# Patient Record
Sex: FEMALE | Race: WHITE | NOT HISPANIC OR LATINO | Employment: OTHER | URBAN - METROPOLITAN AREA
[De-identification: names, ages, dates, MRNs, and addresses within clinical notes are randomized per-mention and may not be internally consistent; named-entity substitution may affect disease eponyms.]

---

## 2017-08-16 ENCOUNTER — ESTABLISHED PATIENT (OUTPATIENT)
Dept: URBAN - METROPOLITAN AREA CLINIC 36 | Facility: CLINIC | Age: 72
End: 2017-08-16

## 2017-08-16 DIAGNOSIS — H04.123: ICD-10-CM

## 2017-08-16 DIAGNOSIS — H35.373: ICD-10-CM

## 2017-08-16 DIAGNOSIS — H35.3130: ICD-10-CM

## 2017-08-16 DIAGNOSIS — H26.493: ICD-10-CM

## 2017-08-16 DIAGNOSIS — Z96.1: ICD-10-CM

## 2017-08-16 PROCEDURE — G8427 DOCREV CUR MEDS BY ELIG CLIN: HCPCS

## 2017-08-16 PROCEDURE — 92015 DETERMINE REFRACTIVE STATE: CPT

## 2017-08-16 PROCEDURE — 1036F TOBACCO NON-USER: CPT

## 2017-08-16 PROCEDURE — 92014 COMPRE OPH EXAM EST PT 1/>: CPT

## 2017-08-16 PROCEDURE — G8785 BP SCRN NO PERF AT INTERVAL: HCPCS

## 2017-08-16 ASSESSMENT — VISUAL ACUITY
OS_SC: 20/70
OS_PH: 20/25-1
OS_SC: J1+
OD_SC: 20/25-1
OD_SC: J5

## 2017-08-16 ASSESSMENT — TONOMETRY
OS_IOP_MMHG: 22
OD_IOP_MMHG: 19

## 2018-04-09 NOTE — PATIENT DISCUSSION
(H25.13) Age-related nuclear cataract, bilateral - Assesment : Examination revealed cataract. CL wearer. Pt deferred dilation today. - Plan : Monitor for changes. Advised patient to call our office with decreased vision or increased symptoms. Updated GLRx and CLRx given today. RTC in 1 year for CL Exam, sooner if problems or changes occur.

## 2018-08-21 ENCOUNTER — ESTABLISHED COMPREHENSIVE EXAM (OUTPATIENT)
Dept: URBAN - METROPOLITAN AREA CLINIC 36 | Facility: CLINIC | Age: 73
End: 2018-08-21

## 2018-08-21 DIAGNOSIS — H35.3130: ICD-10-CM

## 2018-08-21 DIAGNOSIS — H35.373: ICD-10-CM

## 2018-08-21 PROCEDURE — 4177F TALK PT/CRGVR RE AREDS PREV: CPT

## 2018-08-21 PROCEDURE — G9903 PT SCRN TBCO ID AS NON USER: HCPCS

## 2018-08-21 PROCEDURE — 1036F TOBACCO NON-USER: CPT

## 2018-08-21 PROCEDURE — 92014 COMPRE OPH EXAM EST PT 1/>: CPT

## 2018-08-21 PROCEDURE — 2019F DILATED MACUL EXAM DONE: CPT

## 2018-08-21 PROCEDURE — G8427 DOCREV CUR MEDS BY ELIG CLIN: HCPCS

## 2018-08-21 ASSESSMENT — VISUAL ACUITY
OS_SC: J1+
OS_SC: 20/70
OU_SC: 20/25
OU_SC: J1+
OD_SC: J5
OD_SC: 20/20-2
OS_PH: 20/40+2

## 2018-08-21 ASSESSMENT — TONOMETRY
OS_IOP_MMHG: 16
OD_IOP_MMHG: 16

## 2019-07-01 NOTE — PATIENT DISCUSSION
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 No changes reported. No holes or tears noted today. - Plan : Monitor for changes. Advised patient to call our office with decreased vision or an increase in flashes and/or floaters.

## 2019-07-01 NOTE — PATIENT DISCUSSION
(H25.13) Age-related nuclear cataract, bilateral - Assesment : Examination revealed cataract. Pt is bothered and symptomatic. Due to vision changes pt is nervous driving her grandkids around. CL wearer. Pt has some claustrophobia. - Plan : Recommended cataract extraction to improve visual function. Discussed procedure, lens options, risks, and benefits. All questions answered. Pt wishes to proceed. Schedule A-scan and consult.

## 2019-08-15 NOTE — PATIENT DISCUSSION
"(H11.432) Conjunctival hyperemia, left eye - Assesment : Conjunctival hyperemia, left eye. - Plan : Start Gatifloxacin gtts OS tid for 1 week. E-Rx sent to pharmacy. Discontinue CL wear until symptoms improve and stops Gatifloxacin. Recommended regular use of ATs, such as Refresh Repair or Refresh Optive.  Advised to avoid ""get the red out"" drops

## 2019-08-22 ENCOUNTER — ESTABLISHED COMPREHENSIVE EXAM (OUTPATIENT)
Dept: URBAN - METROPOLITAN AREA CLINIC 36 | Facility: CLINIC | Age: 74
End: 2019-08-22

## 2019-08-22 DIAGNOSIS — H35.3131: ICD-10-CM

## 2019-08-22 DIAGNOSIS — H26.491: ICD-10-CM

## 2019-08-22 DIAGNOSIS — H26.492: ICD-10-CM

## 2019-08-22 DIAGNOSIS — H35.373: ICD-10-CM

## 2019-08-22 PROCEDURE — 92014 COMPRE OPH EXAM EST PT 1/>: CPT

## 2019-08-22 PROCEDURE — 92134 CPTRZ OPH DX IMG PST SGM RTA: CPT

## 2019-08-22 ASSESSMENT — VISUAL ACUITY
OU_SC: 20/30+1
OU_SC: J1+
OS_SC: J1
OD_SC: J5
OS_PH: 20/40+2
OS_SC: 20/200
OD_SC: 20/30-2

## 2019-08-22 ASSESSMENT — TONOMETRY
OD_IOP_MMHG: 17
OS_IOP_MMHG: 22

## 2020-09-01 ENCOUNTER — ESTABLISHED COMPREHENSIVE EXAM (OUTPATIENT)
Dept: URBAN - METROPOLITAN AREA CLINIC 36 | Facility: CLINIC | Age: 75
End: 2020-09-01

## 2020-09-01 DIAGNOSIS — H35.341: ICD-10-CM

## 2020-09-01 DIAGNOSIS — H35.373: ICD-10-CM

## 2020-09-01 DIAGNOSIS — H35.3131: ICD-10-CM

## 2020-09-01 DIAGNOSIS — H52.7: ICD-10-CM

## 2020-09-01 DIAGNOSIS — H04.123: ICD-10-CM

## 2020-09-01 DIAGNOSIS — H26.491: ICD-10-CM

## 2020-09-01 DIAGNOSIS — H26.492: ICD-10-CM

## 2020-09-01 PROCEDURE — 92014 COMPRE OPH EXAM EST PT 1/>: CPT

## 2020-09-01 PROCEDURE — 92134 CPTRZ OPH DX IMG PST SGM RTA: CPT

## 2020-09-01 PROCEDURE — 92015 DETERMINE REFRACTIVE STATE: CPT

## 2020-09-01 ASSESSMENT — VISUAL ACUITY
OD_SC: 20/25-1
OS_SC: 20/60-2
OS_SC: J1-1
OD_SC: J6

## 2020-09-01 ASSESSMENT — TONOMETRY
OD_IOP_MMHG: 16
OS_IOP_MMHG: 18

## 2020-09-02 NOTE — PATIENT DISCUSSION
Call with any vision changes or if symtpoms worsen, do not improve, or if new symptoms or vision changes occur.

## 2020-09-02 NOTE — PATIENT DISCUSSION
Start Besivance qid OD for 1 week. Sample given and E-Rx sent to pharmacy today. Advised drop is milky and can cause some blurred vision.

## 2020-09-02 NOTE — PATIENT DISCUSSION
Per Pt, she occasionally sleeps in her dailies and sometimes will then continue to wear them for the nest day. Discussed importance of not over-wearing and sleeping in CL.

## 2020-09-08 NOTE — PATIENT DISCUSSION
Per last visit Pt reported, she occasionally sleeps in her dailies and sometimes will then continue to wear them for next day. Discussed importance of not over-wearing and sleeping in CL.

## 2020-09-11 NOTE — PATIENT DISCUSSION
Okay to restart CL wear, but stressed importance of calling with any redness, pain, vision changes, or other concerns.

## 2021-09-13 ENCOUNTER — DILATED FUNDUS EXAM (OUTPATIENT)
Dept: URBAN - METROPOLITAN AREA CLINIC 36 | Facility: CLINIC | Age: 76
End: 2021-09-13

## 2021-09-13 DIAGNOSIS — H35.3131: ICD-10-CM

## 2021-09-13 DIAGNOSIS — H26.491: ICD-10-CM

## 2021-09-13 DIAGNOSIS — H04.123: ICD-10-CM

## 2021-09-13 DIAGNOSIS — H35.341: ICD-10-CM

## 2021-09-13 DIAGNOSIS — H35.373: ICD-10-CM

## 2021-09-13 DIAGNOSIS — H26.492: ICD-10-CM

## 2021-09-13 PROCEDURE — 92014 COMPRE OPH EXAM EST PT 1/>: CPT

## 2021-09-13 PROCEDURE — 92134 CPTRZ OPH DX IMG PST SGM RTA: CPT

## 2021-09-13 ASSESSMENT — VISUAL ACUITY
OS_PH: 20/30-2
OD_SC: J8
OU_SC: J1+
OU_SC: 20/20-2
OD_SC: 20/25-1
OS_SC: J1+
OS_SC: 20/200

## 2021-09-13 ASSESSMENT — TONOMETRY
OS_IOP_MMHG: 20
OD_IOP_MMHG: 18

## 2022-09-12 ENCOUNTER — ESTABLISHED PATIENT (OUTPATIENT)
Dept: URBAN - METROPOLITAN AREA CLINIC 36 | Facility: CLINIC | Age: 77
End: 2022-09-12

## 2022-09-12 DIAGNOSIS — H35.341: ICD-10-CM

## 2022-09-12 DIAGNOSIS — H35.373: ICD-10-CM

## 2022-09-12 DIAGNOSIS — H35.3131: ICD-10-CM

## 2022-09-12 DIAGNOSIS — H04.123: ICD-10-CM

## 2022-09-12 DIAGNOSIS — H26.493: ICD-10-CM

## 2022-09-12 PROCEDURE — 92014 COMPRE OPH EXAM EST PT 1/>: CPT

## 2022-09-12 PROCEDURE — 92134 CPTRZ OPH DX IMG PST SGM RTA: CPT

## 2022-09-12 ASSESSMENT — VISUAL ACUITY
OD_SC: J3
OD_SC: 20/25+1
OS_SC: J1+
OS_SC: 20/70
OS_PH: 20/40+1

## 2022-09-12 ASSESSMENT — TONOMETRY
OS_IOP_MMHG: 19
OD_IOP_MMHG: 15

## 2023-09-12 ENCOUNTER — ESTABLISHED PATIENT (OUTPATIENT)
Dept: URBAN - METROPOLITAN AREA CLINIC 36 | Facility: CLINIC | Age: 78
End: 2023-09-12

## 2023-09-12 DIAGNOSIS — H35.3131: ICD-10-CM

## 2023-09-12 DIAGNOSIS — H35.373: ICD-10-CM

## 2023-09-12 DIAGNOSIS — H35.343: ICD-10-CM

## 2023-09-12 DIAGNOSIS — H04.123: ICD-10-CM

## 2023-09-12 DIAGNOSIS — H26.493: ICD-10-CM

## 2023-09-12 PROCEDURE — 92014 COMPRE OPH EXAM EST PT 1/>: CPT

## 2023-09-12 PROCEDURE — 92134 CPTRZ OPH DX IMG PST SGM RTA: CPT

## 2023-09-12 ASSESSMENT — VISUAL ACUITY
OD_SC: J5
OS_SC: J1
OD_SC: 20/25-2
OS_SC: 20/70-2

## 2023-09-12 ASSESSMENT — TONOMETRY
OS_IOP_MMHG: 18
OD_IOP_MMHG: 16

## 2024-09-17 ENCOUNTER — COMPREHENSIVE EXAM (OUTPATIENT)
Dept: URBAN - METROPOLITAN AREA CLINIC 36 | Facility: CLINIC | Age: 79
End: 2024-09-17

## 2024-09-17 DIAGNOSIS — H35.343: ICD-10-CM

## 2024-09-17 DIAGNOSIS — H35.373: ICD-10-CM

## 2024-09-17 DIAGNOSIS — H40.013: ICD-10-CM

## 2024-09-17 DIAGNOSIS — H26.493: ICD-10-CM

## 2024-09-17 DIAGNOSIS — H04.123: ICD-10-CM

## 2024-09-17 DIAGNOSIS — H35.3131: ICD-10-CM

## 2024-09-17 PROCEDURE — 92134 CPTRZ OPH DX IMG PST SGM RTA: CPT

## 2024-09-17 PROCEDURE — 99213 OFFICE O/P EST LOW 20 MIN: CPT
